# Patient Record
Sex: FEMALE | Race: WHITE | NOT HISPANIC OR LATINO | Employment: OTHER
[De-identification: names, ages, dates, MRNs, and addresses within clinical notes are randomized per-mention and may not be internally consistent; named-entity substitution may affect disease eponyms.]

---

## 2017-09-24 ENCOUNTER — HEALTH MAINTENANCE LETTER (OUTPATIENT)
Age: 57
End: 2017-09-24

## 2019-11-09 ENCOUNTER — HEALTH MAINTENANCE LETTER (OUTPATIENT)
Age: 59
End: 2019-11-09

## 2020-02-23 ENCOUNTER — HEALTH MAINTENANCE LETTER (OUTPATIENT)
Age: 60
End: 2020-02-23

## 2020-12-06 ENCOUNTER — HEALTH MAINTENANCE LETTER (OUTPATIENT)
Age: 60
End: 2020-12-06

## 2021-09-26 ENCOUNTER — HEALTH MAINTENANCE LETTER (OUTPATIENT)
Age: 61
End: 2021-09-26

## 2021-11-21 ENCOUNTER — HEALTH MAINTENANCE LETTER (OUTPATIENT)
Age: 61
End: 2021-11-21

## 2022-01-16 ENCOUNTER — HEALTH MAINTENANCE LETTER (OUTPATIENT)
Age: 62
End: 2022-01-16

## 2023-04-23 ENCOUNTER — HEALTH MAINTENANCE LETTER (OUTPATIENT)
Age: 63
End: 2023-04-23

## 2023-05-25 ENCOUNTER — OFFICE VISIT (OUTPATIENT)
Dept: CARDIOLOGY | Facility: CLINIC | Age: 63
End: 2023-05-25
Payer: COMMERCIAL

## 2023-05-25 ENCOUNTER — ANCILLARY PROCEDURE (OUTPATIENT)
Dept: CARDIOLOGY | Facility: CLINIC | Age: 63
End: 2023-05-25
Attending: INTERNAL MEDICINE
Payer: COMMERCIAL

## 2023-05-25 VITALS
SYSTOLIC BLOOD PRESSURE: 127 MMHG | HEIGHT: 65 IN | BODY MASS INDEX: 16.83 KG/M2 | DIASTOLIC BLOOD PRESSURE: 74 MMHG | HEART RATE: 63 BPM | OXYGEN SATURATION: 99 % | WEIGHT: 101 LBS

## 2023-05-25 DIAGNOSIS — R00.2 PALPITATIONS: ICD-10-CM

## 2023-05-25 DIAGNOSIS — R00.2 PALPITATIONS: Primary | ICD-10-CM

## 2023-05-25 PROCEDURE — 99203 OFFICE O/P NEW LOW 30 MIN: CPT | Performed by: INTERNAL MEDICINE

## 2023-05-25 PROCEDURE — 93244 EXT ECG>48HR<7D REV&INTERPJ: CPT | Performed by: INTERNAL MEDICINE

## 2023-05-25 PROCEDURE — 93242 EXT ECG>48HR<7D RECORDING: CPT | Performed by: INTERNAL MEDICINE

## 2023-05-25 RX ORDER — LEVOTHYROXINE SODIUM 75 UG/1
75 TABLET ORAL DAILY
COMMUNITY
Start: 2023-04-14

## 2023-05-25 RX ORDER — ALENDRONATE SODIUM 70 MG/1
70 TABLET ORAL DAILY
COMMUNITY
Start: 2023-01-16 | End: 2023-05-25

## 2023-05-25 NOTE — PATIENT INSTRUCTIONS
Thank you for coming to the [facility] at [place]; please note the following instructions:    1. Echocardiogram    2. Zio monitor for 7 days     We have applied a Zio Patch (heart) monitor for you to wear for 7 days.  You may remove the heart monitor on  at June 1st at 3:45 pm.  Please see the enclosed instructions for further information, as well as instructions for removal of the heart monitor and return mailing directions.  If you should have questions regarding your monitor, please call Monogram. at [phone].  The Cardiology Nurse will contact you with your results (please see result notification details at bottom of summary).    *PLEASE DO NOT SHOWER OR INDUCE EXCESSIVE SWEATING IN THE FIRST 24 HOURS OF WEARING*    Please also call your insurance company for any billing questions regarding the monitor.     3. Follow up with Cardiology in 1 month          If you have any questions regarding your visit, please contact your care team:     CARDIOLOGY  TELEPHONE NUMBER   Adelaide CALIXTO., Registered Nurse  Taina MA, Registered Nurse  Mulu HAWTHORNE, Registered Nurse  Glo MATA, Registered Medical Assistant  Enma LEE, Certified Medical Assistant  Rachell KRAUSE, Visit Facilitator [phone] (select option 1)    *After hours: [phone]   For Scheduling Appts:     [phone] (select option 1)    *After hours: [phone]   For the Device Clinic (Pacemakers and ICD's)  Mya MCKEON Registered Nurse   During business hours: [phone]    *After business hours:  [phone] (select option 4)      Normal test result notifications will be released via [facility] or mailed within 7 business days.  All other test results, will be communicated via telephone once reviewed by your cardiologist.    If you need a medication refill, please contact your pharmacy.  Please allow 3 business days for your refill to be completed.    As always, thank you for trusting us with your health care needs!

## 2023-05-25 NOTE — PROGRESS NOTES
I am delighted to see Suzanna Glynn in consultation.The encounter diagnosis was Palpitations.   As you know, the patient is a 63 year old  female. She   has a past medical history of Cancer of thyroid gland (H), Hypothyroidism, and Palpitations..    On this visit, the patient states that she has palpitations.  The patient denies chest pressure/discomfort, dyspnea, near-syncope, syncope, orthopnea, paroxysmal nocturnal dyspnea and lower extermity edema.    The patient's cardiovascular risk factors include positive family history.    The following portions of the patient's history were reviewed and updated as appropriate: allergies, current medications, past family history, past medical history, past social history, past surgical history, and the problem list.    PMH: The patient's past medical history includes:    Past Medical History:   Diagnosis Date     Cancer of thyroid gland (H)      Hypothyroidism      Palpitations       Past Surgical History:   Procedure Laterality Date     HC THYROIDECTOMY  01/92    cancer     PE TUBES       TONSILLECTOMY         The patient's medications as of the current encounter are:     Current Outpatient Medications   Medication Sig Dispense Refill     levothyroxine (SYNTHROID/LEVOTHROID) 75 MCG tablet Take 75 mcg by mouth daily       MULTI VITAMIN/MINERALS OR TABS 1 TABLET DAILY         Labs:     No results found for any previous visit.       Allergies:  No Known Allergies    Family History:   Family History   Problem Relation Age of Onset     Heart Disease Mother      LUNG DISEASE Mother      Coronary Artery Disease Father      Heart Disease Father      Heart Disease Maternal Grandmother      Heart Disease Maternal Grandfather      Cancer Paternal Grandmother      Cancer Paternal Grandfather      Parkinsonism Sister      Breast Cancer Sister      No Known Problems Sister      No Known Problems Daughter      No Known Problems Daughter        Psychosocial history:  reports  "that she has never smoked. She has never used smokeless tobacco. She reports that she does not drink alcohol and does not use drugs.    Review of systems: negative for, exertional chest pain or pressure, paroxysmal nocturnal dyspnea, dyspnea on exertion, orthopnea, lower extremity edema, syncope or near-syncope, claudication and exercise intolerance    In addition,   General: No change in weight, sleep or appetite.  Normal energy.  No fever or chills  Eyes: Negative for vision changes or eye problems  ENT: No problems with ears, nose or throat.  No difficulty swallowing.  Resp: No coughing, wheezing or shortness of breath  GI: No nausea, vomiting,  heartburn, abdominal pain, diarrhea, constipation or change in bowel habits  : No urinary frequency or dysuria, bladder or kidney problems  Musculoskeletal: No significant muscle or joint pains  Neurologic: No headaches, numbness, tingling, weakness, problems with balance or coordination  Psychiatric: No problems with anxiety, depression or mental health  Heme/immune/allergy: No history of bleeding or clotting problems or anemia.    Endocrine: Thyroid resection for cancer  Skin: No rashes,worrisome lesions or skin problems  Vascular:  No claudication, lifestyle limiting or otherwise; no ischemic rest pain; no non-healing ulcers. No weakness, No loss of sensation        Physical examination  Vitals: /74   Pulse 63   Ht 1.638 m (5' 4.5\")   Wt 45.8 kg (101 lb)   SpO2 99%   BMI 17.07 kg/m    BMI= Body mass index is 17.07 kg/m .    In general, the patient is a pleasant female in no apparent distress.    HEENT: Normiocephalic and atraumatic.  PERRLA.  EOMI.  Sclerae white, not injected.  Nares clear.  Pharynx without erythema or exudate.  Dentition intact.    Neck: No adenopathy.  No thyromegaly. Carotids +2/2 bilaterally without bruits.  No jugular venous distension.   Heart:  The PMI is in the 5th ICS in the midclavicular line. There is no heave. Regular rate and " rhythm. Normal S1, S2 splits physiologically. No murmur, rub, click, or gallop.    Lungs: Clear to asculation.  No ronchi, wheezes, rales.  No dullness to percussion.   Abdomen: Soft, nontender, nondistended. No organomegaly. No AAA.  No bruits.   Extremities: No clubbing, cyanosis, or edema. The pulses were intact bilaterally.   Neurological: The neurological examination reveal a patient who was oriented to person, place, and time.  The remainder of the examination was nonfocal.    Cardiac tests include:    none    Assessment and Plan    1. Palpitations - recent T4, electrolytes normal  - check echo, ziopatch  - no history of sudden death in the family    The patient is to return  In 1 month. The patient understood the treatment plan as outlined above.  There were no barriers to learning.      Wellington Rios MD

## 2023-05-25 NOTE — LETTER
5/25/2023      RE: Suzanna Glynn  [address] [facility] [address]       Dear Colleague,    Thank you for the opportunity to participate in the care of your patient, Suzanna Glynn, at the [facility]. Please see a copy of my visit note below.    I am delighted to see Suzanna Glynn in consultation.The encounter diagnosis was Palpitations.   As you know, the patient is a 63 year old  female. She   has a past medical history of Cancer of thyroid gland (H), Hypothyroidism, and Palpitations..    On this visit, the patient states that she has palpitations.  The patient denies chest pressure/discomfort, dyspnea, near-syncope, syncope, orthopnea, paroxysmal nocturnal dyspnea and lower extermity edema.    The patient's cardiovascular risk factors include positive family history.    The following portions of the patient's history were reviewed and updated as appropriate: allergies, current medications, past family history, past medical history, past social history, past surgical history, and the problem list.    PMH: The patient's past medical history includes:    Past Medical History:   Diagnosis Date    Cancer of thyroid gland (H)     Hypothyroidism     Palpitations       Past Surgical History:   Procedure Laterality Date    HC THYROIDECTOMY  01/92    cancer    PE TUBES      TONSILLECTOMY         The patient's medications as of the current encounter are:     Current Outpatient Medications   Medication Sig Dispense Refill    levothyroxine (SYNTHROID/LEVOTHROID) 75 MCG tablet Take 75 mcg by mouth daily      MULTI VITAMIN/MINERALS OR TABS 1 TABLET DAILY         Labs:     No results found for any previous visit.       Allergies:  No Known Allergies    Family History:   Family History   Problem Relation Age of Onset    Heart Disease Mother     LUNG DISEASE Mother     Coronary Artery Disease Father     Heart Disease  "Father     Heart Disease Maternal Grandmother     Heart Disease Maternal Grandfather     Cancer Paternal Grandmother     Cancer Paternal Grandfather     Parkinsonism Sister     Breast Cancer Sister     No Known Problems Sister     No Known Problems Daughter     No Known Problems Daughter        Psychosocial history:  reports that she has never smoked. She has never used smokeless tobacco. She reports that she does not drink alcohol and does not use drugs.    Review of systems: negative for, exertional chest pain or pressure, paroxysmal nocturnal dyspnea, dyspnea on exertion, orthopnea, lower extremity edema, syncope or near-syncope, claudication and exercise intolerance    In addition,   General: No change in weight, sleep or appetite.  Normal energy.  No fever or chills  Eyes: Negative for vision changes or eye problems  ENT: No problems with ears, nose or throat.  No difficulty swallowing.  Resp: No coughing, wheezing or shortness of breath  GI: No nausea, vomiting,  heartburn, abdominal pain, diarrhea, constipation or change in bowel habits  : No urinary frequency or dysuria, bladder or kidney problems  Musculoskeletal: No significant muscle or joint pains  Neurologic: No headaches, numbness, tingling, weakness, problems with balance or coordination  Psychiatric: No problems with anxiety, depression or mental health  Heme/immune/allergy: No history of bleeding or clotting problems or anemia.    Endocrine: Thyroid resection for cancer  Skin: No rashes,worrisome lesions or skin problems  Vascular:  No claudication, lifestyle limiting or otherwise; no ischemic rest pain; no non-healing ulcers. No weakness, No loss of sensation        Physical examination  Vitals: /74   Pulse 63   Ht 1.638 m (5' 4.5\")   Wt 45.8 kg (101 lb)   SpO2 99%   BMI 17.07 kg/m    BMI= Body mass index is 17.07 kg/m .    In general, the patient is a pleasant female in no apparent distress.    HEENT: Normiocephalic and atraumatic.  " PERRLA.  EOMI.  Sclerae white, not injected.  Nares clear.  Pharynx without erythema or exudate.  Dentition intact.    Neck: No adenopathy.  No thyromegaly. Carotids +2/2 bilaterally without bruits.  No jugular venous distension.   Heart:  The PMI is in the 5th ICS in the midclavicular line. There is no heave. Regular rate and rhythm. Normal S1, S2 splits physiologically. No murmur, rub, click, or gallop.    Lungs: Clear to asculation.  No ronchi, wheezes, rales.  No dullness to percussion.   Abdomen: Soft, nontender, nondistended. No organomegaly. No AAA.  No bruits.   Extremities: No clubbing, cyanosis, or edema. The pulses were intact bilaterally.   Neurological: The neurological examination reveal a patient who was oriented to person, place, and time.  The remainder of the examination was nonfocal.    Cardiac tests include:    none    Assessment and Plan    1. Palpitations - recent T4, electrolytes normal  - check echo, ziopatch  - no history of sudden death in the family    The patient is to return  In 1 month. The patient understood the treatment plan as outlined above.  There were no barriers to learning.      Wellington Rios MD

## 2023-05-25 NOTE — NURSING NOTE
"Chief Complaint   Patient presents with     New Patient     Palpitations     Recheck Medication       Initial /74   Pulse 63   Ht 1.638 m (5' 4.5\")   Wt 45.8 kg (101 lb)   SpO2 99%   BMI 17.07 kg/m   Estimated body mass index is 17.07 kg/m  as calculated from the following:    Height as of this encounter: 1.638 m (5' 4.5\").    Weight as of this encounter: 45.8 kg (101 lb)..  BP completed using cuff size: small anika Mccord CMA ([facility]AMA)  "

## 2023-06-12 ENCOUNTER — ANCILLARY PROCEDURE (OUTPATIENT)
Dept: CARDIOLOGY | Facility: CLINIC | Age: 63
End: 2023-06-12
Attending: INTERNAL MEDICINE
Payer: COMMERCIAL

## 2023-06-12 DIAGNOSIS — R00.2 PALPITATIONS: ICD-10-CM

## 2023-06-12 LAB — LVEF ECHO: NORMAL

## 2023-06-12 PROCEDURE — 93306 TTE W/DOPPLER COMPLETE: CPT | Mod: GC | Performed by: INTERNAL MEDICINE

## 2023-09-13 ENCOUNTER — OFFICE VISIT (OUTPATIENT)
Dept: CARDIOLOGY | Facility: CLINIC | Age: 63
End: 2023-09-13
Payer: COMMERCIAL

## 2023-09-13 VITALS
DIASTOLIC BLOOD PRESSURE: 59 MMHG | OXYGEN SATURATION: 99 % | HEART RATE: 62 BPM | HEIGHT: 65 IN | SYSTOLIC BLOOD PRESSURE: 97 MMHG | BODY MASS INDEX: 16.83 KG/M2 | WEIGHT: 101 LBS

## 2023-09-13 DIAGNOSIS — R00.2 PALPITATIONS: Primary | ICD-10-CM

## 2023-09-13 PROCEDURE — 99213 OFFICE O/P EST LOW 20 MIN: CPT | Performed by: INTERNAL MEDICINE

## 2023-09-13 NOTE — PATIENT INSTRUCTIONS
Thank you for coming to the [facility] at [place]; please note the following instructions:    1. Follow up with Cardiology as needed. If you need further refills, please talk to your primary care provider. If you are having further cardiac related issues, we will be happy to see you.         If you have any questions regarding your visit, please contact your care team:     CARDIOLOGY  TELEPHONE NUMBER   Adelaide BALLESTEROS, Registered Nurse  Mulu HAWTHORNE, Registered Nurse  Glo MATA, Registered Medical Assistant  Enma LEE, Certified Medical Assistant  Rachell KRAUSE, Visit Facilitator [phone] (select option 1)    *After hours: [phone]   For Scheduling Appts:     [phone] (select option 1)    *After hours: [phone]   For the Device Clinic (Pacemakers and ICD's)  Mya MCKEON, Registered Nurse   During business hours: [phone]    *After business hours:  [phone] (select option 4)      Normal test result notifications will be released via [employer] or mailed within 7 business days.  All other test results, will be communicated via telephone once reviewed by your cardiologist.    If you need a medication refill, please contact your pharmacy.  Please allow 3 business days for your refill to be completed.    As always, thank you for trusting us with your health care needs!

## 2023-09-13 NOTE — PROGRESS NOTES
I am delighted to see Suzanna Glynn in consultation.There were no encounter diagnoses.   As you know, the patient is a 63 year old  female. She   has a past medical history of Cancer of thyroid gland (H), Hypothyroidism, and Palpitations..    On this visit, the patient states that she has occasional palpitations.  The patient denies chest pressure/discomfort, dyspnea, near-syncope, syncope, orthopnea, paroxysmal nocturnal dyspnea, and lower extermity edema.    The patient's cardiovascular risk factors include none.    The following portions of the patient's history were reviewed and updated as appropriate: allergies, current medications, past family history, past medical history, past social history, past surgical history, and the problem list.    PMH: The patient's past medical history includes:    Past Medical History:   Diagnosis Date    Cancer of thyroid gland (H)     Hypothyroidism     Palpitations       Past Surgical History:   Procedure Laterality Date    HC THYROIDECTOMY  01/92    cancer    PE TUBES      TONSILLECTOMY         The patient's medications as of the current encounter are:     Current Outpatient Medications   Medication Sig Dispense Refill    levothyroxine (SYNTHROID/LEVOTHROID) 75 MCG tablet Take 75 mcg by mouth daily      MULTI VITAMIN/MINERALS OR TABS 1 TABLET DAILY         Labs:     Ancillary Procedure on 06/12/2023   Component Date Value Ref Range Status    LVEF  06/12/2023 55-60%   Final       Allergies:  No Known Allergies    Family History:   Family History   Problem Relation Age of Onset    Heart Disease Mother     LUNG DISEASE Mother     Coronary Artery Disease Father     Heart Disease Father     Heart Disease Maternal Grandmother     Heart Disease Maternal Grandfather     Cancer Paternal Grandmother     Cancer Paternal Grandfather     Parkinsonism Sister     Breast Cancer Sister     No Known Problems Sister     No Known Problems Daughter     No Known Problems Daughter   "      Psychosocial history:  reports that she has never smoked. She has never used smokeless tobacco. She reports that she does not drink alcohol and does not use drugs.    Review of systems: negative for, exertional chest pain or pressure, paroxysmal nocturnal dyspnea, dyspnea on exertion, orthopnea, lower extremity edema, syncope or near-syncope, claudication, and exercise intolerance    In addition,   General: No change in weight, sleep or appetite.  Normal energy.  No fever or chills  Eyes: Negative for vision changes or eye problems  ENT: No problems with ears, nose or throat.  No difficulty swallowing.  Resp: No coughing, wheezing or shortness of breath  GI: No nausea, vomiting,  heartburn, abdominal pain, diarrhea, constipation or change in bowel habits  : No urinary frequency or dysuria, bladder or kidney problems  Musculoskeletal: No significant muscle or joint pains  Neurologic: No headaches, numbness, tingling, weakness, problems with balance or coordination  Psychiatric: No problems with anxiety, depression or mental health  Heme/immune/allergy: No history of bleeding or clotting problems or anemia.    Endocrine: Thyroid resection for cancer  Skin: No rashes,worrisome lesions or skin problems  Vascular:  No claudication, lifestyle limiting or otherwise; no ischemic rest pain; no non-healing ulcers. No weakness, No loss of sensation      Physical examination  Vitals: BP 97/59   Pulse 62   Ht 1.638 m (5' 4.5\")   Wt 45.8 kg (101 lb)   SpO2 99%   BMI 17.07 kg/m    BMI= Body mass index is 17.07 kg/m .    In general, the patient is a pleasant female in no apparent distress.    HEENT: Normiocephalic and atraumatic.  PERRLA.  EOMI.  Sclerae white, not injected.  Nares clear.  Pharynx without erythema or exudate.  Dentition intact.    Neck: No adenopathy.  No thyromegaly. Carotids +2/2 bilaterally without bruits.  No jugular venous distension.   Heart:  The PMI is in the 5th ICS in the midclavicular line. " There is no heave. Regular rate and rhythm. Normal S1, S2 splits physiologically. No murmur, rub, click, or gallop.    Lungs: Clear to asculation.  No ronchi, wheezes, rales.  No dullness to percussion.   Abdomen: Soft, nontender, nondistended. No organomegaly. No AAA.  No bruits.   Extremities: No clubbing, cyanosis, or edema. The pulses were intact bilaterally.   Neurological: The neurological examination reveal a patient who was oriented to person, place, and time.  The remainder of the examination was nonfocal.    Cardiac tests include:    5/25/2023 - zio - 1 run nonsustained SVT  6/12/23 - echo - normal EF    Assessment and Plan    1. Palpitations - Patient falls in a low risk group for significant arrhythmias  - patient elects expectant management    The patient is to return  prn. The patient understood the treatment plan as outlined above.  There were no barriers to learning.      Wellington Rios MD

## 2023-09-13 NOTE — NURSING NOTE
"Chief Complaint   Patient presents with    FU Cardiac testing     Palpitations - S/P Zio and Echo       Initial BP 97/59   Pulse 62   Ht 1.638 m (5' 4.5\")   Wt 45.8 kg (101 lb)   SpO2 99%   BMI 17.07 kg/m   Estimated body mass index is 17.07 kg/m  as calculated from the following:    Height as of this encounter: 1.638 m (5' 4.5\").    Weight as of this encounter: 45.8 kg (101 lb)..  BP completed using cuff size: parker Mccord CMA ([facility])    "

## 2023-09-13 NOTE — LETTER
9/13/2023      RE: Suzanna Glynn  [address] [facility] [address]       Dear Colleague,    Thank you for the opportunity to participate in the care of your patient, Suzanna Glynn, at the [facility]. Please see a copy of my visit note below.    I am delighted to see Suzanna Glynn in consultation.There were no encounter diagnoses.   As you know, the patient is a 63 year old  female. She   has a past medical history of Cancer of thyroid gland (H), Hypothyroidism, and Palpitations..    On this visit, the patient states that she has occasional palpitations.  The patient denies chest pressure/discomfort, dyspnea, near-syncope, syncope, orthopnea, paroxysmal nocturnal dyspnea, and lower extermity edema.    The patient's cardiovascular risk factors include none.    The following portions of the patient's history were reviewed and updated as appropriate: allergies, current medications, past family history, past medical history, past social history, past surgical history, and the problem list.    PMH: The patient's past medical history includes:    Past Medical History:   Diagnosis Date     Cancer of thyroid gland (H)      Hypothyroidism      Palpitations       Past Surgical History:   Procedure Laterality Date     HC THYROIDECTOMY  01/92    cancer     PE TUBES       TONSILLECTOMY         The patient's medications as of the current encounter are:     Current Outpatient Medications   Medication Sig Dispense Refill     levothyroxine (SYNTHROID/LEVOTHROID) 75 MCG tablet Take 75 mcg by mouth daily       MULTI VITAMIN/MINERALS OR TABS 1 TABLET DAILY         Labs:     Ancillary Procedure on 06/12/2023   Component Date Value Ref Range Status     LVEF  06/12/2023 55-60%   Final       Allergies:  No Known Allergies    Family History:   Family History   Problem Relation Age of Onset     Heart Disease Mother      LUNG  "DISEASE Mother      Coronary Artery Disease Father      Heart Disease Father      Heart Disease Maternal Grandmother      Heart Disease Maternal Grandfather      Cancer Paternal Grandmother      Cancer Paternal Grandfather      Parkinsonism Sister      Breast Cancer Sister      No Known Problems Sister      No Known Problems Daughter      No Known Problems Daughter        Psychosocial history:  reports that she has never smoked. She has never used smokeless tobacco. She reports that she does not drink alcohol and does not use drugs.    Review of systems: negative for, exertional chest pain or pressure, paroxysmal nocturnal dyspnea, dyspnea on exertion, orthopnea, lower extremity edema, syncope or near-syncope, claudication, and exercise intolerance    In addition,   General: No change in weight, sleep or appetite.  Normal energy.  No fever or chills  Eyes: Negative for vision changes or eye problems  ENT: No problems with ears, nose or throat.  No difficulty swallowing.  Resp: No coughing, wheezing or shortness of breath  GI: No nausea, vomiting,  heartburn, abdominal pain, diarrhea, constipation or change in bowel habits  : No urinary frequency or dysuria, bladder or kidney problems  Musculoskeletal: No significant muscle or joint pains  Neurologic: No headaches, numbness, tingling, weakness, problems with balance or coordination  Psychiatric: No problems with anxiety, depression or mental health  Heme/immune/allergy: No history of bleeding or clotting problems or anemia.    Endocrine: Thyroid resection for cancer  Skin: No rashes,worrisome lesions or skin problems  Vascular:  No claudication, lifestyle limiting or otherwise; no ischemic rest pain; no non-healing ulcers. No weakness, No loss of sensation      Physical examination  Vitals: BP 97/59   Pulse 62   Ht 1.638 m (5' 4.5\")   Wt 45.8 kg (101 lb)   SpO2 99%   BMI 17.07 kg/m    BMI= Body mass index is 17.07 kg/m .    In general, the patient is a " pleasant female in no apparent distress.    HEENT: Normiocephalic and atraumatic.  PERRLA.  EOMI.  Sclerae white, not injected.  Nares clear.  Pharynx without erythema or exudate.  Dentition intact.    Neck: No adenopathy.  No thyromegaly. Carotids +2/2 bilaterally without bruits.  No jugular venous distension.   Heart:  The PMI is in the 5th ICS in the midclavicular line. There is no heave. Regular rate and rhythm. Normal S1, S2 splits physiologically. No murmur, rub, click, or gallop.    Lungs: Clear to asculation.  No ronchi, wheezes, rales.  No dullness to percussion.   Abdomen: Soft, nontender, nondistended. No organomegaly. No AAA.  No bruits.   Extremities: No clubbing, cyanosis, or edema. The pulses were intact bilaterally.   Neurological: The neurological examination reveal a patient who was oriented to person, place, and time.  The remainder of the examination was nonfocal.    Cardiac tests include:    5/25/2023 - zio - 1 run nonsustained SVT  6/12/23 - echo - normal EF    Assessment and Plan    1. Palpitations - Patient falls in a low risk group for significant arrhythmias  - patient elects expectant management    The patient is to return  prn. The patient understood the treatment plan as outlined above.  There were no barriers to learning.      Wellington Rios MD

## 2024-06-29 ENCOUNTER — HEALTH MAINTENANCE LETTER (OUTPATIENT)
Age: 64
End: 2024-06-29

## 2025-06-22 ENCOUNTER — HEALTH MAINTENANCE LETTER (OUTPATIENT)
Age: 65
End: 2025-06-22

## 2025-07-13 ENCOUNTER — HEALTH MAINTENANCE LETTER (OUTPATIENT)
Age: 65
End: 2025-07-13